# Patient Record
Sex: FEMALE | Race: WHITE
[De-identification: names, ages, dates, MRNs, and addresses within clinical notes are randomized per-mention and may not be internally consistent; named-entity substitution may affect disease eponyms.]

---

## 2017-02-17 ENCOUNTER — HOSPITAL ENCOUNTER (OUTPATIENT)
Dept: HOSPITAL 75 - CARD | Age: 72
End: 2017-02-17
Attending: PAIN MEDICINE
Payer: MEDICARE

## 2017-02-17 VITALS — WEIGHT: 268 LBS | HEIGHT: 61 IN | BODY MASS INDEX: 50.6 KG/M2

## 2017-02-17 VITALS — DIASTOLIC BLOOD PRESSURE: 84 MMHG | SYSTOLIC BLOOD PRESSURE: 165 MMHG

## 2017-02-17 VITALS — DIASTOLIC BLOOD PRESSURE: 81 MMHG | SYSTOLIC BLOOD PRESSURE: 169 MMHG

## 2017-02-17 DIAGNOSIS — M51.16: Primary | ICD-10-CM

## 2017-02-17 DIAGNOSIS — Z79.899: ICD-10-CM

## 2017-02-17 DIAGNOSIS — M96.1: ICD-10-CM

## 2017-02-17 PROCEDURE — 62323 NJX INTERLAMINAR LMBR/SAC: CPT

## 2017-02-17 NOTE — PAIN MEDICINE-PROCEDURE
Procedure


Pre-Op/Post-Op Diagnosis


Diagnosis:  Disc disorder with radiculopathy, lumbar


Indications for Operation


Low back pain


Attending Surgeon


Carlos





Procedure


Date of Service:  Feb 17, 2017


PROCEDURE:  Caudal Epidural Steroid Injection with catheter under Flouroscopic 

Guidance





PROCEDURE NOTE:  





After obtaining written informed consent patient was taken to the procedure 

room. Vital signs were monitored through out the procedure.  A time out was 

performed.  The patient was placed in the prone position on fluoroscopy table.  

The lower back above the caudal space was prepped with chloraprep and draped in 

the usual sterile fashion.  The skin over the sacral hiatus was identified 

under fluoroscopic guidance and infiltrated with 1% lidocaine for local 

anesthesia via 25 gauge needle.  An 17-gauge epimed needle was used to access 

the epidural space under fluoroscopic guidance and was then advanced into the 

epidural space under fluoroscopic guidance in the AP view.  The epimed catheter 

was then advanced under flourospopic guidance to the L5-S1 interspace.  There 

was no paresthesia with catheter placement.  After negative aspiration 1 cc of 

the contrast dye was injected through the needle with good spread of the 

medication in the epidural space at the appropriate levels.  Again, after 

negative aspiration, 80 mg of kenalog with 2 cc of 0.25% marcaine and 2 mL's of 

preservative free normal saline was injected.  There was no evidence of CSF, 

paresthesia or heme during the procedure. The catheter and needle were 

withdrawn as a unit and the tip was noted to be intact upon removal.   Skin was 

cleaned and a sterile dressing was applied.  





Following the procedure the patient's vital signs were stable. The patient was 

discharged home after a brief period of observation with no new neuologic 

deficits.


Complications


None








PA MURDOCK MD Feb 17, 2017 9:18 am

## 2017-02-17 NOTE — XMS REPORT
Continuity of Care Document

 Created on: 2017



Laura Blanco

External Reference #: [id number]

: 1945

Sex: Female



Demographics







 Address  [address] [place]  [address]

 

 Home Phone  [phone]

 

 Preferred Language  English

 

 Marital Status  

 

 Scientologist Affiliation  COM

 

 Race  White or 

 

 Ethnic Group  Not  or 





Author







 Author  [facility]

 

 Organization  [facility]

 

 Address  Unknown

 

 Phone  Unavailable







Support







 Name  Relationship  Address  Phone

 

 , Ana Lilia Ballard  ECON  Unknown  [phone]

 

 , DarshanadebbeiASHLYN HARVEY  ECON  Unknown  [phone]







Care Team Providers







 Care Team Member Name  Role  Phone

 

 Chidi Contreras  PCP  [phone]







Source Comments

Some departments are not documenting in the electronic medical record.  If you 
do not see the information that you expected, contact Release of Information in 
the Health Information Management department at [phone] for further 
assistance in locating additional records.[facility]



Active Allergies and Adverse Reactions

No Known Allergies



Current Medications







      



  Prescription   Sig.   Disp.   Refills   Start   End Date   Status



      Date  

 

      



  gabapentin (NEURONTIN)   Take  by mouth. Takes 600       Active



  300 mg capsule   mg in the morning and 300     



   mg in the evening     

 

      



  esomeprazole DR(+)   Take 40 mg by mouth every       Active



  (NEXIUM) 40 mg capsule   morning.     

 

      



  aspirin 81 mg chewable   Take 81 mg by mouth       Active



  tablet   daily.     

 

      



  labetalol (NORMODYNE) 100   Take 100 mg by mouth       Active



  mg tablet   twice daily.     

 

      



  folic acid (FOLVITE) 1 mg   Take 1 mg by mouth daily.       Active



  tablet      

 

      



  phentermine(+) 37.5 mg   Take 37.5 mg by mouth       Active



  tablet   every morning.     

 

      



  allopurinol (ZYLOPRIM)   Take 300 mg by mouth       Active



  300 mg tablet   daily.     

 

      



  methotrexate 2.5 mg   Take 20 mg by mouth every       Active



  tablet   Thursday.     

 

      



  naproxen (NAPROSYN) 500   Take 500 mg by mouth       Active



  mg tablet   twice daily with meals.     

 

      



  citalopram (CELEXA) 20 mg   Take 30 mg by mouth       Active



  tablet   daily.     

 

      



  vitamins, multi stress   Take 1 Tab by mouth       Active



  formula (STRESS 600) tab   daily.     

 

      



  calcium carbonate/vitamin   Take 1 Tab by mouth       Active



  D-3 (OSCAL-500+D) 1250   daily. Calcium Carb     



  mg/200 unit tablet   1250mg delivers 500mg     



   elemental Ca     

 

      



  nystatin (NYSTOP) 100,000   Apply  to affected area       Active



  unit/g topical powder   four times daily as     



   needed.     







Active Problems







 



  Problem   Noted Date

 

 



  Spondylolisthesis at L4-L5 level   2016

 

 



  Nephrolithiasis 













  Overview:



  Since ~ .



  multiple (L) Renal ESWL ~ s -- .



  (L) URS w/ Laser Lithotripsy -- 2014; Dr. Wilson.





  L



  ast Assessment & Plan:



  (L) Ureteroscopy w/ Laser Lithotripsy & Stent Placement -- 2014; Dr. Wilson



  Possible risks & complications of surgery including but not limited to



  bleeding, infection, allergic reactions, heart and blood pressure problems,



  ureteral injury, inability to fragment stone, residual stone fragments,



  need for further procedures, stent irritation, injury to contiguous



  structures, and anesthesia complications including, but not limited to deep



  venous thrombosis, heart attack, stroke, pulmonary embolism, respiratory



  arrest, and death.  Pt demonstrates an understanding & wishes to proceed.



  Surgical consent signed in clinic today.



  Hold ASA, anticoagulants, blood thinners, NSAIDs, fish oil, & herbal



  supplements, etc. x 1 wk prior to surgery, unless otherwise directed.



  Pre-op abx, Levaquin IV, on-call to OR day of surgery.



  Labs: Reviewed.  UCx -- no growth.



  Imaging: [facility] PACS.



  Consults: None.



  All questions answered to her satisfaction.







Most Recent Encounters







    



  Date   Type   Specialty   Providers   Description

 

    



  2017   Telephone   Orthopedic Surgery   Ger Gomez MD   Error

 

    



  2016   Office Visit   Orthopedic Surgery   Ger Gomez MD   
Spondylolisthesis at



      L4-L5 level (Primary Dx)

 

    



  2016   Orders Only   Orthopedic Surgery   Ger Gomez MD   Back 
pain, unspecified



      back location,



      unspecified back pain



      laterality, unspecified



      chronicity (Primary Dx)







Social History







    



  Tobacco Use   Types   Packs/Day   Years Used   Date

 

    



  Never Smoker    









   



  Smokeless Tobacco: Never   



  Used   









   



  Alcohol Use   Drinks/Week   oz/Week   Comments

 

   



  Yes     infrequently







Last Filed Vital Signs







  



  Vital Sign   Reading   Time Taken

 

  



  Blood Pressure   154/68   2016 12:08 PM CST

 

  



  Pulse   68   2016 12:08 PM CST

 

  



  Temperature   36.6   C (97.9   F)   2016 12:08 PM CST

 

  



  Respiratory Rate   18   2016 12:08 PM CST

 

  



  Height   1.549 m (5' 1")   2016 12:08 PM CST

 

  



  Weight   123.605 kg (272 lb 8 oz)   2016 12:08 PM CST

 

  



  Body Mass Index   51.51   2016 12:08 PM CST

 

  



  Oxygen Saturation   97%   2016 12:08 PM CST







Plan of Care







   



  Health Maintenance   Due Date   Last Done   Comments

 

   



  Hepatitis C Screening   1945  

 

   



  Physical (Comprehensive)   09/15/1953  



  Exam   

 

   



  Pertussis Vaccine   09/15/1957  

 

   



  Tetanus Vaccine   09/15/1963  

 

   



  Breast Cancer Screening   09/15/1986  

 

   



  Colorectal Cancer   09/15/1996  



  Screening   

 

   



  Shingles Vaccine   09/15/2006  

 

   



  Osteoporosis Screening   09/15/2011  

 

   



  Prevnar/Pneumovax (#1)   09/15/2011  

 

   



  Influenza Vaccine   2016  







Results from Last 3 Months

Not on file

## 2018-09-14 ENCOUNTER — HOSPITAL ENCOUNTER (OUTPATIENT)
Dept: HOSPITAL 75 - RAD | Age: 73
End: 2018-09-14
Attending: PHYSICIAN ASSISTANT
Payer: MEDICARE

## 2018-09-14 DIAGNOSIS — M47.27: ICD-10-CM

## 2018-09-14 DIAGNOSIS — M43.16: ICD-10-CM

## 2018-09-14 DIAGNOSIS — M99.73: ICD-10-CM

## 2018-09-14 DIAGNOSIS — M46.07: ICD-10-CM

## 2018-09-14 DIAGNOSIS — M51.16: ICD-10-CM
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
## 2018-09-14 DIAGNOSIS — M48.061: Primary | ICD-10-CM

## 2018-09-14 DIAGNOSIS — Z98.890: ICD-10-CM

## 2018-09-14 PROCEDURE — 72148 MRI LUMBAR SPINE W/O DYE: CPT

## 2018-09-14 NOTE — DIAGNOSTIC IMAGING REPORT
PROCEDURE: MRI lumbar spine.



TECHNIQUE: Multiplanar, multisequence MRI of the lumbar spine was

performed without contrast.



INDICATION: Recent falls and low back pain.



COMPARISON: No prior MRI studies are available for comparison.



FINDINGS: There is normal lumbar lordotic curvature. Minimal

anterolisthesis of L4 on L5 is identified. Vertebral body heights

are maintained. No acute compression fracture seen. No geographic

marrow lesion is identified. There is generalized lumbar

degenerative disc disease with variable disc space narrowing and

desiccation. The conus is unremarkable at the L1 level.



T12-L1: Central canal is widely patent. Neural foramina are

patent.



L1-2: Unremarkable.



L2-3: There is broad-based disc/osteophyte complex indenting the

ventral thecal sac. The central canal remains patent. There is

mild right neural foraminal and lateral recess narrowing. Left

neural foramen is patent.



L3-4: There is some ligamentous thickening and facet changes.

Asymmetric right posterolateral broad-based disc/osteophyte

complex does result in moderate neural foraminal stenosis. The

left neural foramen is patent. Central canal is patent.



L4-5: Postsurgical changes are noted to the facets. No definite

central canal stenosis is seen. There is broad-based

disc/osteophyte complex resulting in narrowing of the lateral

recesses bilaterally. There is also moderate bilateral neural

foraminal stenosis.



L5-S1: Degenerative facet changes are noted. There is ligamentous

thickening present. Broad-based disc/osteophyte complex is

present. This does result in moderate bilateral neural foraminal

stenosis. This appears most significant on the left.



Paraspinous tissues are unremarkable.



IMPRESSION: Lumbar spondylosis with multilevel lateral recess and

neural foraminal stenosis, described level by level above. No

acute compression fracture is seen.



Dictated by: 



  Dictated on workstation # [id number]

## 2020-11-19 ENCOUNTER — HOSPITAL ENCOUNTER (EMERGENCY)
Dept: HOSPITAL 75 - ER FS | Age: 75
Discharge: HOME | End: 2020-11-19
Payer: MEDICARE

## 2020-11-19 VITALS — SYSTOLIC BLOOD PRESSURE: 180 MMHG | DIASTOLIC BLOOD PRESSURE: 63 MMHG

## 2020-11-19 DIAGNOSIS — Z82.61: ICD-10-CM

## 2020-11-19 DIAGNOSIS — S20.211A: Primary | ICD-10-CM
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
## 2020-11-19 DIAGNOSIS — Z79.82: ICD-10-CM

## 2020-11-19 DIAGNOSIS — Z80.41: ICD-10-CM

## 2020-11-19 DIAGNOSIS — K21.9: ICD-10-CM

## 2020-11-19 DIAGNOSIS — Z80.42: ICD-10-CM
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
## 2020-11-19 DIAGNOSIS — Z83.3: ICD-10-CM

## 2020-11-19 DIAGNOSIS — Z80.1: ICD-10-CM

## 2020-11-19 DIAGNOSIS — Z82.49: ICD-10-CM

## 2020-11-19 PROCEDURE — 96374 THER/PROPH/DIAG INJ IV PUSH: CPT

## 2020-11-19 PROCEDURE — 72070 X-RAY EXAM THORAC SPINE 2VWS: CPT

## 2020-11-19 PROCEDURE — 71101 X-RAY EXAM UNILAT RIBS/CHEST: CPT

## 2020-11-19 NOTE — DIAGNOSTIC IMAGING REPORT
INDICATION: Fall, pain right chest wall.



TECHNIQUE: AP, lateral and swimmers imaging of the thoracic

spine.



CORRELATION STUDY: None.



FINDINGS: There is mild anterior wedging of midthoracic vertebral

bodies. An acute appearing compression deformity is not

suggested. There is multilevel thoracic spondylosis and various

degrees of disc space narrowing. Examination is compromised from

motion artifact.



IMPRESSION: No definite evidence for acute bony abnormality.

There is mild anterior wedging of midthoracic vertebral bodies,

likely chronic and degenerative in nature. If continued concern,

correlation with CT and/or MRI would be recommended.



Dictated by: 



  Dictated on workstation # [id number]

## 2020-11-19 NOTE — ED FALL/INJURY
General


Chief Complaint:  Trauma-Non Activation


Stated Complaint:  FELL,CHEST PAIN,SOA


Source:  patient


Exam Limitations:  no limitations





History of Present Illness


Date Seen by Provider:  Nov 19, 2020


Time Seen by Provider:  21:43


Initial Comments


75-year-old female presents with pain in her right chest wall. Patient fell 

earlier today. She reports she has some mild pain under her right breast.  

Patient does not remember hitting her chest wall but is having a lot of pain 

there. She reports that her fall was early this afternoon. Patient reports that 

she hit her head but did not lose consciousness. She does not have any symptoms 

with this. Patient does have some mild shortness of breath due to pain with 

inspiration. She reports that when she fell she hit a metal pole. Patient came 

in this evening because the pain in her rib/chest wall has gotten a lot worse 

this evening.





Allergies and Home Medications


Allergies


Coded Allergies:  


     No Known Allergies (Unverified  Allergy, Unknown, 5/9/16)





Home Medications


Allopurinol 300 Mg Tab, 300 MG PO HS, (Reported)


Aspirin 81 Mg Tablet.dr, 81 MG PO HS, (Reported)


Biotin 1,000 Mcg Tablet, 1,000 MCG PO DAILY, (Reported)


Calcium Carbonate/Vitamin D3 1 Each Tablet, 1 TAB PO HS, (Reported)


Cholecalciferol 5,000 Unit Tablet, 5,000 UNIT PO HS, (Reported)


Citalopram Hydrobromide 20 Mg Tablet, 30 MG PO DAILY, (Reported)


   TAKES 1&1/2 (20MG) TABLET 


Esomeprazole Magnesium 40 Mg Capsule.dr, 40 MG PO HS, (Reported)


Folic Acid 1 Mg Tablet, 1 MG PO HS, (Reported)


Gabapentin 300 Mg Capsule, 600 MG PO DAILY, (Reported)


   TAKE 2 (300MG) CAP 


Hydrocodone Bit/Acetaminophen 1 Each Tablet, 1 EACH PO Q4H PRN for PAIN


   Prescribed by: ARACELI VALDERRAMA on 5/11/16 1236


Labetalol Hcl 100 Mg Tablet, 100 MG PO HS, (Reported)


Lovastatin 20 Mg Tablet, 20 MG PO HS, (Reported)


Mu-Vits-Min Th/Lycopene/Lutein 1 Each Tablet, 1 TAB PO HS, (Reported)





Patient Home Medication List


Home Medication List Reviewed:  Yes





Review of Systems


Review of Systems


Constitutional:  No chills; fever


Eyes:  No Symptoms Reported


Ears, Nose, Mouth, Throat:  no symptoms reported


Respiratory:  see HPI


Cardiovascular:  see HPI


Gastrointestinal:  no symptoms reported


Genitourinary:  no symptoms reported


Musculoskeletal:  see HPI


Skin:  no symptoms reported


Psychiatric/Neurological:  No Symptoms Reported





Past Medical-Social-Family Hx


Past Med/Social Hx:  Reviewed Nursing Past Med/Soc Hx


Patient Social History


Recent Foreign Travel:  No


Contact w/Someone Who Travel:  No





Immunizations Up To Date


Date of Pneumonia Vaccine:  Jul 1, 2012


Date of Influenza Vaccine:  Sep 1, 2012





Seasonal Allergies


Seasonal Allergies:  No





Past Medical History


Reproductive Disorders:  No


Female Reproductive Disorders:  Denies


Sexually Transmitted Disease:  No


HIV/AIDS:  No


Kidney Stones


Gastroesophageal Reflux, Ulcer


Arthritis


Loss of Vision:  Denies


Hearing Impairment:  Hard of Hearing


Skin


Psoriasis


Adverse Reaction/Blood Tranf:  No (N/A)





Family Medical History





Arthritis


  19 FATHER


  19 MOTHER


  G8 BROTHER


  G8 SISTER


Cardiovascular disease


  19 MOTHER


Completed stroke


  19 FATHER


Diabetes mellitus


  19 MOTHER


FH: cancer


  19 MOTHER (LUNG)


  G8 SISTER (OVARIAN)


Hypertension


  19 MOTHER


Prostate cancer


  G8 BROTHER


Respiratory disorder


  19 MOTHER (LUNG CA)


Seizure disorder


  19 FATHER





No Family History of:


  AIDS


  Abdominal aortic aneurysm


  Alcoholism


  Alzheimer's disease


  Asthma


  Cataracts


  Colon cancer


  Congenital disease


  Congenital heart disease


  Coronary thrombosis


  Dementia


  Drug abuse


  Dysphasia


  Glaucoma


  Kidney disease


  Myocardial infarction


  Parkinson's disease


  Psychosocial problem


  Severe allergy


  Thyroid disease


  Tuberculosis


  Visual disorder





Physical Exam


Vital Signs





Vital Signs - First Documented








 11/19/20





 21:33


 


Temp 36.6


 


Pulse 73


 


Resp 18


 


B/P (MAP) 180/63 (102)


 


Pulse Ox 99


 


O2 Delivery Room Air





Capillary Refill :


Height, Weight, BMI


Height: 5'1.00"


Weight: 268lbs. 0.0oz. 121.614479ud; 50.6 BMI


Method:


General Appearance:  no apparent distress


HEENT:  PERRL/EOMI


Neck:  supple, normal inspection


Cardiovascular:  normal peripheral pulses, regular rate, rhythm


Respiratory:  lungs clear, normal breath sounds, no respiratory distress, no 

accessory muscle use, other (tenderness to palpation, right chest wall and 

sternum )


Gastrointestinal:  non tender, soft


Back:  no CVA tenderness, no vertebral tenderness


Neurologic/Psychiatric:  CNs II-XII nml as tested, no motor/sensory deficits, 

alert, normal mood/affect, oriented x 3


Skin:  normal color, warm/dry





Progress/Results/Core Measures


Results/Orders


My Orders





Orders - DON,LAVELLE L DO


Ribs/Unilateral With Chest (11/19/20 21:46)


Thoracic Spine 2 View Only (11/19/20 21:46)


Ketorolac Injection (Toradol Injection) (11/19/20 22:14)





Vital Signs/I&O











 11/19/20





 21:33


 


Temp 36.6


 


Pulse 73


 


Resp 18


 


B/P (MAP) 180/63 (102)


 


Pulse Ox 99


 


O2 Delivery Room Air











Progress


Progress Note :  


   Time:  22:17


Progress Note


No fractures noted on x-rays. I will give her a shot of Toradol. . Recommend she

use topical lidocaine and either ice or heat based on what helps the pain to 

help with the chest wall contusion.





Diagnostic Imaging





   Diagonstic Imaging:  Xray


   Plain Films/CT/US/NM/MRI:  chest, other


Comments


No acute fractures or dislocations noted on chest, rib, or thoracic x-rays.


   Reviewed:  Reviewed by Me





Departure


Impression





   Primary Impression:  


   Contusion of right chest wall


   Qualified Codes:  S20.211A - Contusion of right front wall of thorax, initial

   encounter


Disposition:  01 HOME, SELF-CARE


Condition:  Stable





Departure-Patient Inst.


Referrals:  


SALLY BRENNAN MD (PCP/Family)


Primary Care Physician


Patient Instructions:  Bruised Rib (DC), CHEST CONTUSION





Add. Discharge Instructions:  


4% topical lidocaine with menthol in either cream, gel or patch. Use as needed 

as directed on package





Warm moist heat or ice to affected area depending on pain relief every 3-4 hours





All discharge instructions reviewed with patient and/or family. Voiced under

standing.











LAVELLE DON DO               Nov 19, 2020 21:45

## 2020-11-20 NOTE — DIAGNOSTIC IMAGING REPORT
Indication: Right chest wall injury from a fall



PA chest and 2 views of the right ribs are obtained



Heart and mediastinum are normal. Lungs are clear. There are no

effusions or pneumothoraces. There are no displaced rib fracture

seen.



IMPRESSION: Unremarkable chest and right ribs



Dictated by: 



  Dictated on workstation # [id number]

## 2020-11-23 ENCOUNTER — HOSPITAL ENCOUNTER (EMERGENCY)
Dept: HOSPITAL 75 - ER | Age: 75
Discharge: HOME | End: 2020-11-23
Payer: MEDICARE

## 2020-11-23 VITALS — HEIGHT: 60.98 IN | BODY MASS INDEX: 47.58 KG/M2 | WEIGHT: 251.99 LBS

## 2020-11-23 VITALS — SYSTOLIC BLOOD PRESSURE: 146 MMHG | DIASTOLIC BLOOD PRESSURE: 89 MMHG
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
## 2020-11-23 DIAGNOSIS — K21.9: ICD-10-CM

## 2020-11-23 DIAGNOSIS — Z85.828: ICD-10-CM

## 2020-11-23 DIAGNOSIS — Z80.1: ICD-10-CM

## 2020-11-23 DIAGNOSIS — Z82.61: ICD-10-CM

## 2020-11-23 DIAGNOSIS — Z83.3: ICD-10-CM

## 2020-11-23 DIAGNOSIS — Z82.49: ICD-10-CM

## 2020-11-23 DIAGNOSIS — S22.43XA: Primary | ICD-10-CM

## 2020-11-23 DIAGNOSIS — E66.9: ICD-10-CM

## 2020-11-23 DIAGNOSIS — Z80.42: ICD-10-CM

## 2020-11-23 DIAGNOSIS — Z79.82: ICD-10-CM
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
## 2020-11-23 DIAGNOSIS — Z20.828: ICD-10-CM

## 2020-11-23 PROCEDURE — 99282 EMERGENCY DEPT VISIT SF MDM: CPT

## 2020-11-23 PROCEDURE — 87635 SARS-COV-2 COVID-19 AMP PRB: CPT

## 2020-11-23 PROCEDURE — 71250 CT THORAX DX C-: CPT

## 2020-11-23 NOTE — DIAGNOSTIC IMAGING REPORT
PROCEDURE: 

CT chest without contrast.



TECHNIQUE: 

Multiple contiguous axial images were obtained through the chest

without the use of intravenous contrast. Auto Exposure Controls

were utilized during the CT exam to meet ALARA standards for

radiation dose reduction. 



DATE: 

November 23, 2020.



COMPARISON: 

Chest radiograph May 4, 2015. 



INDICATION: 

75-year-old female, fall. Sternal, anterior rib, and chest pain.



PROCEDURE: 

Axial noncontrasted CT images of the chest. Noncontrasted limits

the evaluation of the mediastinum and vascular structures. 



FINDINGS: 

There is no identified pulmonary nodule. There is no lung mass.

There is no focal airspace consolidation. There is no

pneumothorax. There is no pleural effusion.



The heart is not enlarged. There is no pericardial effusion.

There are coronary artery calcifications.



There is no identified abnormally enlarged mediastinal or

axillary lymph node which meets CT size criteria for adenopathy.



There is a partially calcified nodule adjacent to the inferior

margin of the left lobe of the thyroid on axial image 8 measuring

up to 2.7 cm in size which may relate to an exophytic thyroid

nodule.



There is a 6 mm low-attenuation lesion in the left lobe of the

liver on axial image 109 which is too small to characterize. The

gallbladder is surgically absent. There is a stone in the left

renal pelvis incompletely imaged measuring up to 13 mm in size.

There is no left hydronephrosis.



There is a nondisplaced right anterior fourth rib fracture on

axial image 37. There is a nondisplaced left anterior third rib

fracture on axial image 47. There is no identified sternal

fracture. There are degenerative changes of the spine.



IMPRESSION: 

1. Nondisplaced fractures of the right anterior fourth rib and

left anterior third rib.

2. No identified sternal fracture.

3. No identified acute cardiopulmonary abnormality.

4. Incompletely imaged stone in the left renal pelvis measuring

up to 13 mm in size without hydronephrosis.

5. Probable exophytic left-sided thyroid nodule measuring up to

2.7 cm in size. Thyroid ultrasound is recommended for further

assessment.





Dictated by: 



  Dictated on workstation # [id number]

## 2020-11-23 NOTE — ED GENERAL
General


Chief Complaint:  Respiratory Problems


Stated Complaint:  COVID PUI


Nursing Triage Note:  


PT STATES SOB AND UNABLE TO TAKE DEEP BREATH DUE TO PAIN FROM FALL ON . 


STATES RAN INTO SUPPORT BEAM AFTER TRIPPING AT [employer]. SEEN AT [facility] FOR CENTRAL STERNAL AND RIGHT SHOULDER PAIN. HAD X-RAYS AND DR 


DISCHARGED WITH CHEST CONTUSION.


Nursing Sepsis Screen:  No Definite Risk


Source of Information:  Patient


Exam Limitations:  No Limitations





History of Present Illness


Date Seen by Provider:  2020


Time Seen by Provider:  15:20


Initial Comments


Here with report of central anterior chest pain and pain radiated to the right 

shoulder blade.  Onset after fall late last week.  She states she fell onto a 

beam and hit the center of her chest.  She did have x-rays at the urgent care in

[place] this weekend.  No obvious fractures.  Case complicated by the fact 

that her brother is positive for COVID-19.  Her last exposure was last Tuesday. 

He was tested on Friday of last week.  She states that he did mention that he 

did not feel well on Tuesday and they were together for about an hour and a 

half.  He got his results today.  She denies fevers or upper respiratory 

symptoms aside from stuffy nose that she states may be allergies.


Timing/Duration:  4-5 Days


Severity:  Moderate


Associated Systoms:  Chest Pain; No Cough, No Fever/Chills; Shortness of Air 

(Pain with deep breathing)





Allergies and Home Medications


Allergies


Coded Allergies:  


     No Known Allergies (Unverified  Allergy, Unknown, 16)





Home Medications


Allopurinol 300 Mg Tab, 300 MG PO HS, (Reported)


Aspirin 81 Mg Tablet.dr, 81 MG PO HS, (Reported)


Biotin 1,000 Mcg Tablet, 1,000 MCG PO DAILY, (Reported)


Calcium Carbonate/Vitamin D3 1 Each Tablet, 1 TAB PO HS, (Reported)


Cholecalciferol 5,000 Unit Tablet, 5,000 UNIT PO HS, (Reported)


Citalopram Hydrobromide 20 Mg Tablet, 30 MG PO DAILY, (Reported)


   TAKES 1&1/2 (20MG) TABLET 


Esomeprazole Magnesium 40 Mg Capsule.dr, 40 MG PO HS, (Reported)


Folic Acid 1 Mg Tablet, 1 MG PO HS, (Reported)


Gabapentin 300 Mg Capsule, 600 MG PO DAILY, (Reported)


   TAKE 2 (300MG) CAP 


Hydrocodone Bit/Acetaminophen 1 Each Tablet, 1 EACH PO Q4H PRN for PAIN


   Prescribed by: ARACELI VALDERRAMA on 16 1236


Hydrocodone/Acetaminophen 1 Each Tablet, 1 TAB PO Q6H


   Prescribed by: MONAE LUO on 20 1727


Labetalol Hcl 100 Mg Tablet, 100 MG PO HS, (Reported)


Lovastatin 20 Mg Tablet, 20 MG PO HS, (Reported)


Mu-Vits-Min Th/Lycopene/Lutein 1 Each Tablet, 1 TAB PO HS, (Reported)





Patient Home Medication List


Home Medication List Reviewed:  Yes





Review of Systems


Review of Systems


Constitutional:  see HPI; No chills, No fever


EENTM:  see HPI, nose congestion; No throat pain


Respiratory:  see HPI; No cough


Cardiovascular:  chest pain; No edema


Gastrointestinal:  No abdominal pain, No nausea, No vomiting


Genitourinary:  no symptoms reported


Musculoskeletal:  see HPI, back pain, muscle pain


Skin:  no symptoms reported





All Other Systems Reviewed


Negative Unless Noted:  Yes





Past Medical-Social-Family Hx


Past Med/Social Hx:  Reviewed Nursing Past Med/Soc Hx


Patient Social History


Alcohol Use:  Denies Use


Recreational Drug Use:  No


Recent Foreign Travel:  No


Contact w/Someone Who Travel:  No


Recent Infectious Disease Expo:  Yes (BROTHER POSITIVE COVID)


Recent Hopitalizations:  Yes (SURGERIES)





Immunizations Up To Date


Date of Pneumonia Vaccine:  2012


Date of Influenza Vaccine:  Sep 1, 2012





Seasonal Allergies


Seasonal Allergies:  No





Past Medical History


Surgeries:  Yes (BILAT CTR,  BILAT TKR, SEVERAL ESWL, KIDNEY SURGERY AT -TOOK 

OUT 7 STONES)


Respiratory:  No (CURRENT SINUS INFECTION-ON ABX)


Cardiac:  Yes


Neurological:  Yes


Reproductive Disorders:  No


Female Reproductive Disorders:  Denies


Sexually Transmitted Disease:  No


HIV/AIDS:  No


Kidney Stones


Gastrointestinal:  Yes (GERD)


Gastroesophageal Reflux, Ulcer


Musculoskeletal:  Yes (LAMINECTOMNY L4 AND L5, LEFT PATELLA PAIN)


Arthritis


Endocrine:  Yes (PRE DIABETIC)


Loss of Vision:  Denies


Hearing Impairment:  Hard of Hearing


Cancer:  Yes


Skin


Psychosocial:  No


Integumentary:  Yes


Psoriasis


Blood Disorders:  No


Adverse Reaction/Blood Tranf:  No (N/A)





Family Medical History


Reviewed Nursing Family Hx





Arthritis


  19 FATHER


  19 MOTHER


  G8 BROTHER


  G8 SISTER


Cardiovascular disease


  19 MOTHER


Completed stroke


  19 FATHER


Diabetes mellitus


  19 MOTHER


FH: cancer


  19 MOTHER (LUNG)


  G8 SISTER (OVARIAN)


Hypertension


  19 MOTHER


Prostate cancer


  G8 BROTHER


Respiratory disorder


  19 MOTHER (LUNG CA)


Seizure disorder


  19 FATHER





Physical Exam


Vital Signs





Vital Signs - First Documented








 20





 14:02


 


Temp 36.6


 


Pulse 62


 


Resp 24


 


B/P (MAP) 134/64 (87)


 


Pulse Ox 99


 


O2 Delivery Room Air





Capillary Refill : Less Than 3 Seconds


Height, Weight, BMI


Height: 5'1.00"


Weight: 268lbs. 0.0oz. 121.109584if; 47.00 BMI


Method:


General Appearance:  WD/WN, Mild Distress, Obese


HEENT:  PERRL/EOMI, Pharynx Normal


Neck:  Non Tender, Supple


Respiratory:  Lungs Clear, Normal Breath Sounds


Cardiovascular:  Regular Rate, Rhythm, No Murmur


Gastrointestinal:  Non Tender, Soft


Back:  Normal Inspection, No CVA Tenderness, No Vertebral Tenderness


Extremity:  Normal Range of Motion, Non Tender


Neurologic/Psychiatric:  Alert, Oriented x3


Skin:  Normal Color, Warm/Dry; No Ecchymosis





Progress/Results/Core Measures


Suspected Sepsis


Recent Fever Within 48 Hours:  No


Infection Criteria Present:  None


New/Unexplained  Altered Menta:  No


Sepsis Screen:  No Definite Risk


SIRS


Temperature: 


Pulse: 62 


Respiratory Rate: 24


 


Blood Pressure 134 /64 


Mean: 87





Results/Orders


Lab Results





Laboratory Tests








Test


 20


16:26 Range/Units


 








My Orders





Orders - MONAE LUO MD


Coronavirus Sars-Cov-2 So 2019 (20 15:28)


Ct Chest Wo (20 15:28)


Hydrocodone/Apap 5/325 Tablet (Lortab 5 (20 16:15)


Ketorolac Injection (Toradol Injection) (20 16:03)





Medications Given in ED





Current Medications








 Medications  Dose


 Ordered  Sig/Alvin


 Route  Start Time


 Stop Time Status Last Admin


Dose Admin


 


 Acetaminophen/


 Hydrocodone Bitart  1 tab  ONCE  ONCE


 PO  20 16:15


 20 16:16 DC 20 16:20


1 TAB








Vital Signs/I&O











 20





 14:02


 


Temp 36.6


 


Pulse 62


 


Resp 24


 


B/P (MAP) 134/64 (87)


 


Pulse Ox 99


 


O2 Delivery Room Air





Capillary Refill : Less Than 3 Seconds








Blood Pressure Mean:                    87








Progress Note :  


Progress Note


Seen and evaluated.  CT chest ordered.  COVID-19 screening ordered.  Hydrocodone

1 tab p.o. and Toradol 60 mg IM ordered.  Monitor patient.  1720: CT complete 

and shows rib fractures.  These were discussed with the patient.  She feels much

better after pain pill.  We will give prescription for that and have her follow-

up with her doctor.  Discharged home with return precautions.  Patient 

verbalized understanding of instructions and agreement with plan.





Diagnostic Imaging





   Diagonstic Imaging:  CT


   Plain Films/CT/US/NM/MRI:  chest


Comments


                 [facility], [place]





NAME:   DOROTHEA KENT


MED REC#:   [id number]


ACCOUNT#:   [id number]


PT STATUS:   REG ER


:   1945


PHYSICIAN:   MONAE LUO MD


ADMIT DATE:   20/ER


                                   ***Draft***


Date of Exam:20





CT CHEST WO








PROCEDURE: 


CT chest without contrast.





TECHNIQUE: 


Multiple contiguous axial images were obtained through the chest


without the use of intravenous contrast. Auto Exposure Controls


were utilized during the CT exam to meet ALARA standards for


radiation dose reduction. 





DATE: 


2020.





COMPARISON: 


Chest radiograph May 4, 2015. 





INDICATION: 


75-year-old female, fall. Sternal, anterior rib, and chest pain.





PROCEDURE: 


Axial noncontrasted CT images of the chest. Noncontrasted limits


the evaluation of the mediastinum and vascular structures. 





FINDINGS: 


There is no identified pulmonary nodule. There is no lung mass.


There is no focal airspace consolidation. There is no


pneumothorax. There is no pleural effusion.





The heart is not enlarged. There is no pericardial effusion.


There are coronary artery calcifications.





There is no identified abnormally enlarged mediastinal or


axillary lymph node which meets CT size criteria for adenopathy.





There is a partially calcified nodule adjacent to the inferior


margin of the left lobe of the thyroid on axial image 8 measuring


up to 2.7 cm in size which may relate to an exophytic thyroid


nodule.





There is a 6 mm low-attenuation lesion in the left lobe of the


liver on axial image 109 which is too small to characterize. The


gallbladder is surgically absent. There is a stone in the left


renal pelvis incompletely imaged measuring up to 13 mm in size.


There is no left hydronephrosis.





There is a nondisplaced right anterior fourth rib fracture on


axial image 37. There is a nondisplaced left anterior third rib


fracture on axial image 47. There is no identified sternal


fracture. There are degenerative changes of the spine.





IMPRESSION: 


1. Nondisplaced fractures of the right anterior fourth rib and


left anterior third rib.


2. No identified sternal fracture.


3. No identified acute cardiopulmonary abnormality.


4. Incompletely imaged stone in the left renal pelvis measuring


up to 13 mm in size without hydronephrosis.


5. Probable exophytic left-sided thyroid nodule measuring up to


2.7 cm in size. Thyroid ultrasound is recommended for further


assessment.











  Dictated on workstation # [id number]








Dict:   20 1628


Trans:   20 1644


 [id number]





Interpreted by:     KATE SEVILLA MD


Electronically signed by:





Departure


Impression





   Primary Impression:  


   Multiple fractures of ribs of both sides


   Qualified Codes:  S22.43XA - Multiple fractures of ribs, bilateral, initial 

   encounter for closed fracture


Disposition:   HOME, SELF-CARE


Condition:  Stable





Departure-Patient Inst.


Decision time for Depature:  17:23


Referrals:  


SALLY BRENNAN MD (PCP/Family)


Primary Care Physician


Patient Instructions:  Rib Fracture (DC)





Add. Discharge Instructions:  








All discharge instructions reviewed with patient and/or family. Voiced 

understanding.





Use incentive spirometer several times per hour while awake.  Take pain 

medication as directed.  If you are not taking the prescribed pain medicine then

you may take Tylenol/acetaminophen 1000 mg every 6 hours as needed for pain but 

do not take both as they both have acetaminophen in them.  Return for worse 

pain, fever, vomiting, weakness, breathing problems or other concerns as needed.

 Follow-up with your doctor later this week or early next week for recheck and 

further evaluation.


Scripts


Hydrocodone/Acetaminophen (Hydrocodone/Acetaminophen 5 MG/325 MG TAB) 1 Each 

Tablet


1 TAB PO Q6H for Pain MDD 10 TABS for 3 Days, #12 TAB 0 Refills


   Prov: MONAE LUO MD         20





Copy


Copies To 1:   SALLY BRENNAN MD, TIMOTHY D MD          2020 17:01

## 2021-05-25 ENCOUNTER — HOSPITAL ENCOUNTER (OUTPATIENT)
Dept: HOSPITAL 75 - RAD | Age: 76
End: 2021-05-25
Attending: PHYSICIAN ASSISTANT
Payer: MEDICARE

## 2021-05-25 DIAGNOSIS — M43.16: ICD-10-CM

## 2021-05-25 DIAGNOSIS — M51.26: Primary | ICD-10-CM

## 2021-05-25 DIAGNOSIS — M24.28: ICD-10-CM

## 2021-05-25 DIAGNOSIS — M48.061: ICD-10-CM

## 2021-05-25 PROCEDURE — 72148 MRI LUMBAR SPINE W/O DYE: CPT

## 2021-05-25 NOTE — DIAGNOSTIC IMAGING REPORT
PROCEDURE: MRI lumbar spine.



TECHNIQUE: Multiplanar, multisequence MRI of the lumbar spine was

performed without contrast.



INDICATION: Chronic low back pain. 



COMPARISON: MRI lumbar spine without contrast from 09/14/2018.



FINDINGS: Grade 1 anterolisthesis of L4 on L5. Vertebral body

heights are preserved. Right hemilaminotomy at L4-L5. Normal bone

marrow signal.



No abnormal signal in the conus which terminates at L2. Normal

morphology of the cauda equina. The visualized paravertebral soft

tissues and pelvis are unremarkable.



T12-L1: No spinal canal, lateral recess, or neuroforaminal

narrowing.



L1-L2: No spinal canal, lateral recess, or neuroforaminal

narrowing.



L2-L3: Annular disc bulge results in mild spinal canal and

bilateral lateral recess narrowing. Mild bilateral neuroforaminal

narrowing. Increased fluid within the facet joints.



L3-L4: Annular disc bulge and ligamentous hypertrophy result in

mild spinal canal narrowing. Mild-to-moderate bilateral

neuroforaminal narrowing.



L4-L5: Small annular disc bulge results in mild spinal canal and

bilateral lateral recess narrowing. There is moderate right and

mild left neuroforaminal narrowing.



L5-S1: Ligamentous hypertrophy primarily contributes to mild

spinal canal and moderate left lateral recess narrowing.

Moderate-to-severe bilateral neuroforaminal narrowing.



IMPRESSION:

1. Spondylotic changes result in multilevel mild spinal canal

narrowing. No high-grade spinal canal stenosis.

2. Scattered mild-to-moderate neuroforaminal narrowing, as above.

3. No acute osseous findings. Postoperative findings of an old

right hemilaminotomy at L4-L5.



Dictated by: 



  Dictated on workstation # [id number]

## 2022-06-09 ENCOUNTER — HOSPITAL ENCOUNTER (OUTPATIENT)
Dept: HOSPITAL 75 - PREOP | Age: 77
Discharge: HOME | End: 2022-06-09
Attending: SURGERY
Payer: MEDICARE

## 2022-06-09 VITALS — HEIGHT: 60.98 IN | WEIGHT: 261.03 LBS | BODY MASS INDEX: 49.28 KG/M2

## 2022-06-09 DIAGNOSIS — Z01.818: Primary | ICD-10-CM

## 2022-06-15 ENCOUNTER — HOSPITAL ENCOUNTER (OUTPATIENT)
Dept: HOSPITAL 75 - ENDO | Age: 77
Discharge: HOME | End: 2022-06-15
Attending: SURGERY
Payer: MEDICARE

## 2022-06-15 VITALS — SYSTOLIC BLOOD PRESSURE: 124 MMHG | DIASTOLIC BLOOD PRESSURE: 61 MMHG

## 2022-06-15 VITALS — BODY MASS INDEX: 49.28 KG/M2 | HEIGHT: 61.02 IN | WEIGHT: 261.03 LBS

## 2022-06-15 VITALS — SYSTOLIC BLOOD PRESSURE: 166 MMHG | DIASTOLIC BLOOD PRESSURE: 71 MMHG

## 2022-06-15 VITALS — SYSTOLIC BLOOD PRESSURE: 111 MMHG | DIASTOLIC BLOOD PRESSURE: 57 MMHG

## 2022-06-15 VITALS — SYSTOLIC BLOOD PRESSURE: 119 MMHG | DIASTOLIC BLOOD PRESSURE: 81 MMHG

## 2022-06-15 DIAGNOSIS — K57.30: ICD-10-CM

## 2022-06-15 DIAGNOSIS — K64.1: ICD-10-CM

## 2022-06-15 DIAGNOSIS — E66.01: ICD-10-CM

## 2022-06-15 DIAGNOSIS — K64.4: ICD-10-CM

## 2022-06-15 DIAGNOSIS — Z12.11: Primary | ICD-10-CM

## 2022-06-15 NOTE — OPERATIVE REPORT
DATE OF SERVICE:  06/15/2022



ATTENDING PRIMARY CARE PHYSICIAN:

Dr. Contreras.



PREOPERATIVE DIAGNOSIS:

Screening colonoscopy.



POSTOPERATIVE DIAGNOSES:

Mild chronic stage II external and internal hemorrhoids, moderate-to-severe

sigmoid diverticulosis.



PROCEDURE:

Colonoscopy.



SURGEON:

Gina Wan MD



ANESTHESIA:

Monitored anesthesia care.



ESTIMATED BLOOD LOSS:

Minimal.



FINDINGS:

Mild chronic stage II external and internal hemorrhoids, moderate-to-severe

sigmoid diverticulosis.



DISPOSITION:

The patient tolerated the procedure well.



INDICATIONS:

The patient is a 76-year-old female known to us.  She was referred over to us

for a colonoscopy.  Her last colonoscopy was approximately 11 years ago and she

was found to have diverticulosis at this time.  She reports that she has had

some pain in the left lower abdominal quadrant and was treated with antibiotics

and the pain did slowly improve over time.  She does also report that she has

had some constipation in the past as well.  She also reports that she has had

some darker stools in the past.  She does not report any family history of colon

cancer.  She also did have a CT scan performed, which did show nephrolithiasis.



DESCRIPTION OF PROCEDURE:

The patient was brought to the endoscopy suite, laid in the left lateral

decubitus position.  After adequate IV pain and sedative medications and

monitored anesthesia care, a digital rectal examination was performed.  Mild

chronic stage II external and internal hemorrhoids were identified, which were

not actively edematous nor inflamed and no bleeding.  Normal sphincter tone was

felt and there were no palpable masses.



The endoscope was then intubated into the anus and rectum gently insufflated. 

The endoscope was then advanced through the valves of [place] of the rectum with

no polyps or any neoplasms identified.  Through the sigmoid colon, there was a

moderate to severe sigmoid diverticulosis.  There were no mucosal inflammatory

changes to indicate any active diverticulitis.  The endoscope was then advanced

through the remainder of the descending, transverse and ascending colon to the

cecum, which were normal.  There were no polyps or any neoplasms identified

throughout the colon or rectum.  Endoscope was then slowly withdrawn while

taking a second look and suctioning of residual air with no additional findings.



The patient tolerated the procedure well.  We will recommend the necessary

lifestyle and dietary accommodation with a high-fiber diet with a fiber

supplement, which should equal or exceed 25 grams daily as well as significant

amounts of water to promote soft consistency stools on a daily basis.  With

medical management, hopefully, she can avoid any complications of significant

diverticulosis including diverticulitis requiring hospital admission,

perforation, abscess formation as well as significant bleeding.





Job ID: [id number]

DocumentID: [id number]

Dictated Date:  06/15/2022 13:53:36

Transcription Date: 06/15/2022 22:24:29

Dictated By: GINA WAN MD

## 2022-06-15 NOTE — DISCHARGE INST-SURGICAL
D/C Lap Instructions-SOCO


Follow Up 





Activity as tolerated








High Fiber Diet 25g or more per day





Avoid Alcohol, Caffeine, Spicy Greasy and Acid foods.





Drink 64 fluid oz or more of fluids per day.





Symptoms to Report: Fever over 101 degree F, Nausea/Vomiting 


If any problems/questions: Contact your physician or go to Emergency Room











GINA WAN MD                [place]ineet 15, 2022 12:15

## 2022-06-15 NOTE — ANESTHESIA-GENERAL POST-OP
MAC


Patient Condition


Mental Status/LOC:  Same as Preop


Cardiovascular:  Satisfactory


Nausea/Vomiting:  Absent


Respiratory:  Satisfactory


Pain:  Controlled


Complications:  Absent





Post Op Complications


Complications


None





Follow Up Care/Instructions


Patient Instructions


None needed.





Anesthesiology Discharge Order


Discharge Order


Patient is doing well, no complaints, stable vital signs, no apparent adverse 

anesthesia problems.   


No complications reported per nursing.











JOSÉ MIGUEL JIMÉNEZ CRNA            [place]et 15, 2022 13:55

## 2022-06-15 NOTE — PROGRESS NOTE-PRE OPERATIVE
Pre-Operative Progress Note


H&P Reviewed


The H&P was reviewed, patient examined and no changes noted.


Date Seen by Provider:  Vineet 15, 2022


Time Seen by Provider:  12:00


Date H&P Reviewed:  Vineet 15, 2022


Time H&P Reviewed:  12:00


Pre-Operative Diagnosis:  screening GINA Maldonado MD                [place]et 15, 2022 12:13